# Patient Record
(demographics unavailable — no encounter records)

---

## 2024-11-01 NOTE — PHYSICAL EXAM
[No Acute Distress] : no acute distress [Well Nourished] : well nourished [Well Developed] : well developed [Well-Appearing] : well-appearing [Normal Sclera/Conjunctiva] : normal sclera/conjunctiva [PERRL] : pupils equal round and reactive to light [EOMI] : extraocular movements intact [Normal Outer Ear/Nose] : the outer ears and nose were normal in appearance [Normal Oropharynx] : the oropharynx was normal [No JVD] : no jugular venous distention [No Lymphadenopathy] : no lymphadenopathy [Supple] : supple [Thyroid Normal, No Nodules] : the thyroid was normal and there were no nodules present [No Respiratory Distress] : no respiratory distress  [No Accessory Muscle Use] : no accessory muscle use [Clear to Auscultation] : lungs were clear to auscultation bilaterally [Normal Rate] : normal rate  [Regular Rhythm] : with a regular rhythm [Normal S1, S2] : normal S1 and S2 [No Murmur] : no murmur heard [Pedal Pulses Present] : the pedal pulses are present [No Edema] : there was no peripheral edema [Soft] : abdomen soft [Non Tender] : non-tender [Non-distended] : non-distended [No Masses] : no abdominal mass palpated [No HSM] : no HSM [Normal Bowel Sounds] : normal bowel sounds [Normal Posterior Cervical Nodes] : no posterior cervical lymphadenopathy [Normal Anterior Cervical Nodes] : no anterior cervical lymphadenopathy [No CVA Tenderness] : no CVA  tenderness [No Spinal Tenderness] : no spinal tenderness [No Joint Swelling] : no joint swelling [Grossly Normal Strength/Tone] : grossly normal strength/tone [No Rash] : no rash [Coordination Grossly Intact] : coordination grossly intact [No Focal Deficits] : no focal deficits [Normal Gait] : normal gait [Deep Tendon Reflexes (DTR)] : deep tendon reflexes were 2+ and symmetric [Normal Affect] : the affect was normal [Normal Insight/Judgement] : insight and judgment were intact [de-identified] : +eryethmatous scaly rash base R thumb

## 2024-11-01 NOTE — ASSESSMENT
[FreeTextEntry1] : #HCM -pap smear last year UTD, following with GYN -flu shot UTD, will come back for covid vaccine -f/u labs  #Skin lump -likely lipoma vs cyst -given smalll and no discomfort, monitor for now  #Dermatitis -will prescribe triamcinolone cream for 2 weeks -avoid excessive handwashing -keep hands moisturized -if no improvement refer to derm  #Thyroid nodule -following with [facility]ans, appt in March

## 2024-11-01 NOTE — HEALTH RISK ASSESSMENT
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

## 2024-11-01 NOTE — HEALTH RISK ASSESSMENT
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

## 2024-11-01 NOTE — HISTORY OF PRESENT ILLNESS
[FreeTextEntry1] : CPE  [de-identified] : 34 F PMH thyroid nodule y/o pt presents for CPE. Accompanied by  who is translating for patient. Pt c/o rash on dorsal surface R hand. Slightly itchy and dry. Denies any new products.  states when pt washes dishes her hands do get dry. Also mentions small lump R elbow flexor surface, bump has now gotten smaller. Denies fever, chills, pain, swelling, discharge. Pt request allergy testing. Pt is currently on period .

## 2024-11-01 NOTE — HISTORY OF PRESENT ILLNESS
[FreeTextEntry1] : CPE  [de-identified] : 34 F PMH thyroid nodule y/o pt presents for CPE. Accompanied by  who is translating for patient. Pt c/o rash on dorsal surface R hand. Slightly itchy and dry. Denies any new products.  states when pt washes dishes her hands do get dry. Also mentions small lump R elbow flexor surface, bump has now gotten smaller. Denies fever, chills, pain, swelling, discharge. Pt request allergy testing. Pt is currently on period .

## 2024-11-01 NOTE — PHYSICAL EXAM
[No Acute Distress] : no acute distress [Well Nourished] : well nourished [Well Developed] : well developed [Well-Appearing] : well-appearing [Normal Sclera/Conjunctiva] : normal sclera/conjunctiva [PERRL] : pupils equal round and reactive to light [EOMI] : extraocular movements intact [Normal Outer Ear/Nose] : the outer ears and nose were normal in appearance [Normal Oropharynx] : the oropharynx was normal [No JVD] : no jugular venous distention [No Lymphadenopathy] : no lymphadenopathy [Supple] : supple [Thyroid Normal, No Nodules] : the thyroid was normal and there were no nodules present [No Respiratory Distress] : no respiratory distress  [No Accessory Muscle Use] : no accessory muscle use [Clear to Auscultation] : lungs were clear to auscultation bilaterally [Normal Rate] : normal rate  [Regular Rhythm] : with a regular rhythm [Normal S1, S2] : normal S1 and S2 [No Murmur] : no murmur heard [Pedal Pulses Present] : the pedal pulses are present [No Edema] : there was no peripheral edema [Soft] : abdomen soft [Non Tender] : non-tender [Non-distended] : non-distended [No Masses] : no abdominal mass palpated [No HSM] : no HSM [Normal Bowel Sounds] : normal bowel sounds [Normal Posterior Cervical Nodes] : no posterior cervical lymphadenopathy [Normal Anterior Cervical Nodes] : no anterior cervical lymphadenopathy [No CVA Tenderness] : no CVA  tenderness [No Spinal Tenderness] : no spinal tenderness [No Joint Swelling] : no joint swelling [Grossly Normal Strength/Tone] : grossly normal strength/tone [No Rash] : no rash [Coordination Grossly Intact] : coordination grossly intact [No Focal Deficits] : no focal deficits [Normal Gait] : normal gait [Deep Tendon Reflexes (DTR)] : deep tendon reflexes were 2+ and symmetric [Normal Affect] : the affect was normal [Normal Insight/Judgement] : insight and judgment were intact [de-identified] : +eryethmatous scaly rash base R thumb

## 2024-12-19 NOTE — PHYSICAL EXAM
[Chaperone Present] : A chaperone was present in the examining room during all aspects of the physical examination [Appropriately responsive] : appropriately responsive [Alert] : alert [No Acute Distress] : no acute distress [No Lymphadenopathy] : no lymphadenopathy [Regular Rate Rhythm] : regular rate rhythm [No Murmurs] : no murmurs [Clear to Auscultation B/L] : clear to auscultation bilaterally [Soft] : soft [Non-tender] : non-tender [Non-distended] : non-distended [No HSM] : No HSM [No Lesions] : no lesions [No Mass] : no mass [Oriented x3] : oriented x3 [Examination Of The Breasts] : a normal appearance [No Masses] : no breast masses were palpable [Labia Majora] : normal [Labia Minora] : normal [Normal] : normal [Retroversion] : retroverted [Uterine Adnexae] : normal [Tenderness] : nontender [Enlarged ___ wks] : not enlarged [Mass ___ cm] : no uterine mass was palpated [FreeTextEntry6] : RF

## 2024-12-19 NOTE — HISTORY OF PRESENT ILLNESS
[Patient reported PAP Smear was normal] : Patient reported PAP Smear was normal [FreeTextEntry1] : Jimmy Merida presents for well woman visit with gyn exam.  sees endo for thyroid nodule; menses regular  has 3 's 3,8,10 [PapSmeardate] : 2023 [TextBox_31] : HPV +;

## 2025-01-23 NOTE — HISTORY OF PRESENT ILLNESS
[Home] : at home, [unfilled] , at the time of the visit. [Medical Office: ([facility])___] : at the medical office located in  [Verbal consent obtained from patient] : the patient, [unfilled] [FreeTextEntry8] : 35 F presents with sore throat and cough. Symptoms started 6 days ago. She get tested yesterday and negative. Using Tylenol and Vicks rub for symptoms. Denies fever, chills, chest pain, SOB/N/V.

## 2025-02-04 NOTE — PHYSICAL EXAM
[Alert] : alert [No Acute Distress] : no acute distress [No Proptosis] : no proptosis [No Lid Lag] : no lid lag [Normal Hearing] : hearing was normal [No LAD] : no lymphadenopathy [Clear to Auscultation] : lungs were clear to auscultation bilaterally [Normal S1, S2] : normal S1 and S2 [Regular Rhythm] : with a regular rhythm [Normal Affect] : the affect was normal [Normal Mood] : the mood was normal [Acanthosis Nigricans] : no acanthosis nigricans [de-identified] : thyroid palpable, L> R

## 2025-02-04 NOTE — ASSESSMENT
[FreeTextEntry1] : Thyroid nodules, small. Euthyroid. Sore throat is not due to thyroid.  Thyroid nodules typically do not cause any pain. Since pain is worse at night, I thought it might be due to stomach acid.  I suggested trying an over the counter PPI (Nexium or Prilosec) for 1 month and also change her diet to consume less acid- producing foods.   If this does not improve symptoms, then she can see ENT (referral given for Dr Johnson). will recheck thyroid sono.  if nodules are still sub-cm, se can reduce frequency of monitoring to every 2-3 years. RTO 1-2 years

## 2025-02-04 NOTE — DATA REVIEWED
[FreeTextEntry1] : 10/24  TSH 1.25 3/24 TSH 0.99,   TPO 14.4, Tg Ab < 20  thyroid sono, 2/24:  homogenous R upper nodule 4mm, spongiform L nodules: 8mm  (L lower), 4mm (L upper)

## 2025-02-04 NOTE — HISTORY OF PRESENT ILLNESS
[FreeTextEntry1] : here with  For the past 2-3 weeks, she is having pain in her throat, when swallowing and especially at night. She saw her PCP who told her there is no infection. She tried acid medication but only took it for one day. She doesn't have coffee or spicy foods but eats citrus regularly. Her mother needs surgery to remove a thyroid nodule that may be pre cancerous. labs reviewed from 10/24:  TSH 1.25